# Patient Record
Sex: FEMALE | Race: WHITE | NOT HISPANIC OR LATINO | Employment: UNEMPLOYED | URBAN - METROPOLITAN AREA
[De-identification: names, ages, dates, MRNs, and addresses within clinical notes are randomized per-mention and may not be internally consistent; named-entity substitution may affect disease eponyms.]

---

## 2024-05-20 ENCOUNTER — OFFICE VISIT (OUTPATIENT)
Dept: SLEEP MEDICINE | Facility: CLINIC | Age: 54
End: 2024-05-20
Payer: MEDICAID

## 2024-05-20 VITALS
WEIGHT: 239.3 LBS | HEART RATE: 92 BPM | HEIGHT: 71 IN | OXYGEN SATURATION: 97 % | DIASTOLIC BLOOD PRESSURE: 76 MMHG | RESPIRATION RATE: 18 BRPM | SYSTOLIC BLOOD PRESSURE: 108 MMHG | BODY MASS INDEX: 33.5 KG/M2

## 2024-05-20 DIAGNOSIS — G47.33 OSA (OBSTRUCTIVE SLEEP APNEA): ICD-10-CM

## 2024-05-20 DIAGNOSIS — G47.30 SLEEP DISORDER BREATHING: Primary | ICD-10-CM

## 2024-05-20 PROCEDURE — 1036F TOBACCO NON-USER: CPT | Performed by: GENERAL PRACTICE

## 2024-05-20 PROCEDURE — 99204 OFFICE O/P NEW MOD 45 MIN: CPT | Performed by: GENERAL PRACTICE

## 2024-05-20 RX ORDER — INSULIN ASPART 100 [IU]/ML
15 INJECTION, SUSPENSION SUBCUTANEOUS
COMMUNITY
Start: 2024-03-22 | End: 2024-06-20

## 2024-05-20 RX ORDER — ATORVASTATIN CALCIUM 10 MG/1
10 TABLET, FILM COATED ORAL
COMMUNITY
Start: 2024-03-22 | End: 2024-06-20

## 2024-05-20 RX ORDER — TRIAMTERENE/HYDROCHLOROTHIAZID 37.5-25 MG
1 TABLET ORAL
COMMUNITY
Start: 2023-11-03

## 2024-05-20 RX ORDER — GLIMEPIRIDE 1 MG/1
1 TABLET ORAL
COMMUNITY
Start: 2023-12-29

## 2024-05-20 RX ORDER — SERTRALINE HYDROCHLORIDE 100 MG/1
100 TABLET, FILM COATED ORAL 2 TIMES DAILY
COMMUNITY
Start: 2024-02-08

## 2024-05-20 RX ORDER — MIRABEGRON 50 MG/1
50 TABLET, EXTENDED RELEASE ORAL
COMMUNITY
Start: 2024-03-21 | End: 2025-03-21

## 2024-05-20 RX ORDER — ACETAMINOPHEN 500 MG
TABLET ORAL
COMMUNITY
Start: 2023-01-09

## 2024-05-20 RX ORDER — NITROFURANTOIN 25; 75 MG/1; MG/1
CAPSULE ORAL
COMMUNITY
Start: 2024-04-26

## 2024-05-20 RX ORDER — LEVOTHYROXINE SODIUM 75 UG/1
1 TABLET ORAL
COMMUNITY
Start: 2024-03-22 | End: 2024-09-18

## 2024-05-20 RX ORDER — LORATADINE 10 MG/1
10 TABLET ORAL DAILY PRN
COMMUNITY
Start: 2023-10-16

## 2024-05-20 RX ORDER — BUPROPION HYDROCHLORIDE 150 MG/1
1 TABLET ORAL
COMMUNITY
Start: 2024-03-22

## 2024-05-20 RX ORDER — METFORMIN HYDROCHLORIDE 750 MG/1
1 TABLET, EXTENDED RELEASE ORAL 2 TIMES DAILY
COMMUNITY
Start: 2018-08-21

## 2024-05-20 RX ORDER — FLUTICASONE PROPIONATE 50 MCG
1 SPRAY, SUSPENSION (ML) NASAL
COMMUNITY
Start: 2023-10-16

## 2024-05-20 RX ORDER — FLUCONAZOLE 150 MG/1
TABLET ORAL
COMMUNITY
Start: 2024-02-06

## 2024-05-20 RX ORDER — ESTRADIOL 0.05 MG/D
FILM, EXTENDED RELEASE TRANSDERMAL
COMMUNITY
Start: 2017-10-23

## 2024-05-20 RX ORDER — ESCITALOPRAM OXALATE 20 MG/1
20 TABLET ORAL
COMMUNITY
Start: 2024-05-13 | End: 2024-06-12

## 2024-05-20 ASSESSMENT — PATIENT HEALTH QUESTIONNAIRE - PHQ9
2. FEELING DOWN, DEPRESSED OR HOPELESS: NEARLY EVERY DAY
SUM OF ALL RESPONSES TO PHQ9 QUESTIONS 1 AND 2: 6
5. POOR APPETITE OR OVEREATING: NEARLY EVERY DAY
4. FEELING TIRED OR HAVING LITTLE ENERGY: NEARLY EVERY DAY
6. FEELING BAD ABOUT YOURSELF - OR THAT YOU ARE A FAILURE OR HAVE LET YOURSELF OR YOUR FAMILY DOWN: NEARLY EVERY DAY
3. TROUBLE FALLING OR STAYING ASLEEP OR SLEEPING TOO MUCH: NEARLY EVERY DAY
8. MOVING OR SPEAKING SO SLOWLY THAT OTHER PEOPLE COULD HAVE NOTICED. OR THE OPPOSITE, BEING SO FIGETY OR RESTLESS THAT YOU HAVE BEEN MOVING AROUND A LOT MORE THAN USUAL: NOT AT ALL
7. TROUBLE CONCENTRATING ON THINGS, SUCH AS READING THE NEWSPAPER OR WATCHING TELEVISION: NEARLY EVERY DAY
1. LITTLE INTEREST OR PLEASURE IN DOING THINGS: NEARLY EVERY DAY
10. IF YOU CHECKED OFF ANY PROBLEMS, HOW DIFFICULT HAVE THESE PROBLEMS MADE IT FOR YOU TO DO YOUR WORK, TAKE CARE OF THINGS AT HOME, OR GET ALONG WITH OTHER PEOPLE: SOMEWHAT DIFFICULT

## 2024-05-20 ASSESSMENT — COLUMBIA-SUICIDE SEVERITY RATING SCALE - C-SSRS
2. HAVE YOU ACTUALLY HAD ANY THOUGHTS OF KILLING YOURSELF?: NO
1. IN THE PAST MONTH, HAVE YOU WISHED YOU WERE DEAD OR WISHED YOU COULD GO TO SLEEP AND NOT WAKE UP?: NO
6. HAVE YOU EVER DONE ANYTHING, STARTED TO DO ANYTHING, OR PREPARED TO DO ANYTHING TO END YOUR LIFE?: NO

## 2024-05-20 ASSESSMENT — ENCOUNTER SYMPTOMS
OCCASIONAL FEELINGS OF UNSTEADINESS: 1
DEPRESSION: 1
LOSS OF SENSATION IN FEET: 1

## 2024-05-20 NOTE — PROGRESS NOTES
Patient: Tasneem Daly    [id number]  : 1970 -- AGE 54 y.o.    Provider: Melva Walden DO     Location [facility]   Service Date: 2024              [facility]  New Visit Note        HISTORY OF PRESENT ILLNESS     The patient's referring provider is: Cindy Knight, *    HISTORY OF PRESENT ILLNESS   Tasneem Daly is a 54 y.o. female who presents to a [facility] for a sleep medicine evaluation with concerns of Sleep Apnea (Diagnosed with sleep apnea around 2017 had a [facility]. Used a cpap machine for around 2 years. Stopped using cpap machine after she discovered mold was in the machine. ).     The patient  has a past medical history of Personal history of other endocrine, nutritional and metabolic disease (2014)..    PAST SLEEP HISTORY    Patient has pmhx including DM, htn?, hypothyroidism, anxiety/ depression.   In , patient was tested for sleep apnea due to night time awakenings and snoring. She was found to have sleep apnea and used pap therapy for about 2yrs but stopped due to finding mold in the water container.     CURRENT HISTORY    On today's visit, 2024, the patient reports that she would like to establish care for her sleep apnea; she would like to restart pap therapy.       Sleep schedule  on weekdays / work days:  Usual Bedtime: 12am  Sleep latency: 2hrs, sometimes get up to move around.   Wake time : 7am  Total sleep time average/day: 5-6 hours/day  Awakenings: 4-5x per night, nocturia, ringing in the ear, noise on the road, variable length.   Naps: n    Sleep schedule  on weekends/non work days :  Same as above     Sleep aids: melatonin 10mg, 2x per week, unclear if it helps.   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       am headaches: y  night sweats: y    Sleep is described as unrefreshing.     Daytime sleepiness: sometimes  Fatigue or decreased energy: sometimes  Difficulty remembering things in daytime: y  Difficulty staying focused in daytime: :y  Irritable during the day: y    Drowsy driving: n  Hx of car accident: n  Near-miss Car accident: n      RLS screen:  RLSSCREEN: - Sensations: Patient does not have unusual sensations in their extremities that cause an urge to move them   Sometimes her legs \"jerk\" but does not have to urge to move legs.     Sleep-related behaviors: DENIES      ESS: No data recorded       REVIEW OF SYSTEMS     REVIEW OF SYSTEMS  Review of Systems   All other systems reviewed and are negative.        ALLERGIES AND MEDICATIONS     ALLERGIES  Allergies   Allergen Reactions    Penicillins Unknown       MEDICATIONS  Current Outpatient Medications   Medication Sig Dispense Refill    atorvastatin (Lipitor) 10 mg tablet Take 1 tablet (10 mg) by mouth once daily.      blood sugar diagnostic strip Use 4 times daily to monitor blood glucose.      buPROPion XL (Wellbutrin XL) 150 mg 24 hr tablet Take 1 tablet (150 mg) by mouth once daily in the morning. Take before meals.      escitalopram (Lexapro) 20 mg tablet Take 1 tablet (20 mg) by mouth once daily.      estradiol (Vivelle-DOT) 0.05 mg/24 hr patch Place on the skin.      fluconazole (Diflucan) 150 mg tablet       fluticasone (Flonase) 50 mcg/actuation nasal spray Administer 1 spray into affected nostril(s) once daily.      glimepiride (Amaryl) 1 mg tablet Take 1 tablet (1 mg) by mouth once daily with breakfast.      insulin asp prt-insulin aspart (NovoLOG Mix 70-30) 100 unit/mL (70-30) injection Inject 15 Units under the skin.      levothyroxine (Synthroid, Levoxyl) 75 mcg tablet Take 1 tablet (75 mcg) by mouth once daily in the morning. Take before " "meals.      loratadine (Claritin) 10 mg tablet Take 1 tablet (10 mg) by mouth once daily as needed.      melatonin 5 mg tablet As needed      metFORMIN XR (Glucophage-XR) 750 mg 24 hr tablet Take 1 tablet (750 mg) by mouth 2 times a day.      mirabegron (Myrbetriq) 50 mg tablet extended release 24 hr 24 hr tablet Take 1 tablet (50 mg) by mouth once daily.      nitrofurantoin, macrocrystal-monohydrate, (Macrobid) 100 mg capsule       sertraline (Zoloft) 100 mg tablet Take 1 tablet (100 mg) by mouth twice a day.      triamterene-hydrochlorothiazid (Maxzide-25) 37.5-25 mg tablet Take 1 tablet by mouth once daily.       No current facility-administered medications for this visit.         PAST HISTORY     PAST MEDICAL HISTORY  She  has a past medical history of Personal history of other endocrine, nutritional and metabolic disease (06/17/2014).       PAST SURGICAL HISTORY:  Past Surgical History:   Procedure Laterality Date    TONSILLECTOMY  06/11/2014    Tonsillectomy       FAMILY HISTORY  Family History   Problem Relation Name Age of Onset    Heart disease Mother             SOCIAL HISTORY  She  reports that she has quit smoking. Her smoking use included cigarettes. She has never used smokeless tobacco. She reports that she does not drink alcohol and does not use drugs.     Caffeine consumption: 2 cups in am    PHYSICAL EXAM     VITAL SIGNS: /76   Pulse 92   Resp 18 Comment: neck circumfrance 13  Ht 1.803 m (5' 11\")   Wt 109 kg (239 lb 4.8 oz)   SpO2 97%   BMI 33.38 kg/m²      PREVIOUS WEIGHTS:  Wt Readings from Last 3 Encounters:   05/20/24 109 kg (239 lb 4.8 oz)       Physical Exam  Constitutional: Alert and oriented, cooperative, no obvious distress.   HENT: normocephalic.   Eyes: PERRLA, nonicteric   Neck: Supple, trachea midline   respiratory: CTA bilaterally, no wheezing/ crackles/ cough  Cardiac: no rub/ gallops  GI:BS in all 4 quadrants, Soft, nontender, no masses  musculoskeletal/ Extremities: No " clubbing  integumentary: no significant rashes observed.   Neurologic: AOx3.   psychiatric: appropriate mood and affect.  Modified Mallampati: 3      RESULTS/DATA         ASSESSMENT/PLAN     Ms. Daly is a 54 y.o. female and  has a past medical history of Personal history of other endocrine, nutritional and metabolic disease (06/17/2014). She was referred to the [facility] for evaluation of sleep apnea.     Problem List Items Addressed This Visit    None  Visit Diagnoses       Sleep disorder breathing    -  Primary    Relevant Orders    In-Center Sleep Study (Sleep Provider Only)    GILBERTO (obstructive sleep apnea)                Problem List and Orders    Patient has pmhx including DM, htn?, hypothyroidism, anxiety/ depression.       1- sleep disorder breathing/ hx of sleep apnea.   Symptoms include snoring, nocturia, un-refreshing sleep, witnessed apneas, morning dry mouth.     -ordered sleep study  -had a sleep study at [facility] in 2017? No records found, requesting.   -was on pap therapy but machine has mold? Thus stopped using it. Would like to restart pap therapy but need sleep study records to review.   -patient did not bring machine with her to appointment and no downloads available today.     -do not drive or operate heavy machinery if drowsy.  -avoid sleeping on your back.   -avoid sedating substances/ medication, alcohol, illicit drugs and tobacco.    2- anxiety/ depression  Denies any current suicidal ideation, denies wanting to hurt herself or others. Follows with a center and is currently on meds which are helping her.     3- Obesity  counseled on eating a healthy diet and exercising as tolerated.    Follow up after sleep study or sooner as needed.

## 2024-05-20 NOTE — PATIENT INSTRUCTIONS
[facility]   [facility] [place]note 2[place]  [address] [address]  [phone]       NAME: Tasneem Daly   DATE: 5/20/2024     Your Sleep Provider Today: Melva Walden DO  Your Primary Care Physician: No primary care provider on file.   Your Referring Provider: Cindy Knight, *    DIAGNOSIS:   1. Sleep disorder breathing  In-Center Sleep Study (Sleep Provider Only)      2. GILBERTO (obstructive sleep apnea)            Thank you for coming to the Sleep Medicine Clinic today! Your sleep medicine provider today was: Melva Walden DO Below is a summary of your treatment plan, other important information, and our contact numbers:      TREATMENT PLAN   Follow up after sleep study or sooner as needed.       IMPORTANT INFORMATION     Call 911 for medical emergencies.  Our offices are generally open from Monday-Friday, 9 am - 5 pm.  If you need to get in touch with me, you may either call me and my team(number is below) or you can use [employer].  If a referral for a test, for CPAP, or for another specialist was made, and you have not heard about scheduling this within a week, please call scheduling at [phone]).  If you are unable to make your appointment for clinic or an overnight study, kindly call the office at least 48 hours in advance to cancel and reschedule.  If you are on CPAP, please bring your device's card or the device to each clinic appointment.   There are no supporting services by either the sleep doctors or their staff on weekends and Holidays, or after 5 PM on weekdays.   If you have been asked to come to a sleep study, make sure you bring toiletries, a comfy pillow, and any nighttime medications that you may regularly take. Also be sure to eat dinner before you arrive. We generally do not provide meals.      PRESCRIPTIONS     We require 7 days advanced notice for prescription refills. If we do not receive the request in this time, we  cannot guarantee that your medication will be refilled in time.      IMPORTANT PHONE NUMBERS     Sleep Medicine Clinic Fax: [phone]  Appointments (for Pediatric Sleep Clinic): [phone] - option 1  Appointments (for Adult Sleep Clinic): [phone] - option 2  Appointments (For Sleep Studies): [phone] - option 3  Behavioral Sleep Medicine: [phone]  Sleep Surgery: [phone]  ENT (Otolaryngology): [phone]  Headache Clinic (Neurology): [phone]  Neurology: [phone]  Psychiatry: [phone]  Pulmonary Function Testing (PFT) Center: [phone]  Pulmonary Medicine: [phone]  [employer] (DME): [phone]  [employer] (DME): [phone]  [facility] ([facility]): [phone]      OUR ADULT SLEEP MEDICINE TEAM   Please do not hesitate to call the office or sleep nurse with any questions between appointments:    Adult Sleep Nurses (Juliet Kaye, RN and Teresita Bush RN):  For clinical questions and refilling prescriptions: [phone]  Email sleep diaries and other documents at: [email or web address]    Adult Sleep Medicine Secretaries:  Yari Sosa (For Miquel/Gottlieb/Krise/Strohl/Yeh/Gonzales):   P: [phone]  F: [phone]  Mag Pascual (For Blue/Guggenbiller): P: [phone]  Fax: [phone]  Jackie Lezama (For Jurcevic/Blank): P: [phone]  F: [phone]  Shakila Perez (For [place]): P: [phone]  F: [phone]  Nusrat Paniagua (For Heather/Lisandra/Zakhary): P: [phone]  F: [phone]  Lisandra Rucker (For Jason/Hollins): P: [phone]  F: [phone]     Adult Sleep Medicine Advanced Practice Providers:  Idris Talley ([place], [place])  Francie Fry ([facility])  Nieves Evangelista CNP ([facility])  Shellie Cody CNP ([place], [place], [facility])  Yamilet Alicea ([facility], [facility])  Jimmy Hollins CNP ([facility])        OUR SLEEP TESTING  "LOCATIONS     Our team will contact you to schedule your sleep study, however, you can contact us as follow:  Main Phone Line (scheduling only): [phone]), option 3  Adult and Pediatric Locations   [place] (6 years and older): [facility] [place] [facility]"" - 4th floor ([address] [facility]) After hours line: [phone]  [facility] (Main campus: All ages): [facility], 6th floor. After hours line: [phone]   [place] (5 years and older; younger considered on case-by-case basis): [address]; [address], [address]. Scheduling  After hours line: [phone]   [place] (6 years and older): [address]; [address]; [address]   [place] (6 years and older): [address] [facility] [facility] [address], [address]  After hours line: [phone]   [place] (13 years and older) in [place]: [address], 2nd floor  After hours line: [phone]   [place] (13 year and older): [address]  After hours line: [phone]     Adult Only Locations:   [place] (18 years and older): [address] [facility], 2nd floor   [place] (18 years and older): [address] [facility]; 4th floor  After hours line: [phone]  [facility] (18 years and older) at [place]: [address] [place] [address]  After hours line: [phone]          CONTACTING YOUR SLEEP MEDICINE PROVIDER     Send a message directly to your provider through \"My Chart\", which is the email service through your  Records Account: [email or web address]   Call [phone] and leave a message. One of the administrative assistants will forward the message to your sleep medicine provider through \"My Chart\" and/or email.     Your sleep medicine provider for this visit was: Melva Walden,         "

## 2024-07-24 ENCOUNTER — APPOINTMENT (OUTPATIENT)
Dept: SLEEP MEDICINE | Facility: CLINIC | Age: 54
End: 2024-07-24
Payer: MEDICAID

## 2024-08-30 ENCOUNTER — APPOINTMENT (OUTPATIENT)
Dept: INTEGRATIVE MEDICINE | Facility: CLINIC | Age: 54
End: 2024-08-30
Payer: MEDICAID

## 2024-09-05 ENCOUNTER — APPOINTMENT (OUTPATIENT)
Dept: SLEEP MEDICINE | Facility: CLINIC | Age: 54
End: 2024-09-05
Payer: MEDICAID

## 2024-09-05 VITALS
HEIGHT: 71 IN | HEART RATE: 96 BPM | SYSTOLIC BLOOD PRESSURE: 117 MMHG | RESPIRATION RATE: 18 BRPM | DIASTOLIC BLOOD PRESSURE: 80 MMHG | BODY MASS INDEX: 33.74 KG/M2 | WEIGHT: 241 LBS

## 2024-09-05 DIAGNOSIS — F41.9 ANXIETY AND DEPRESSION: ICD-10-CM

## 2024-09-05 DIAGNOSIS — F32.A ANXIETY AND DEPRESSION: ICD-10-CM

## 2024-09-05 DIAGNOSIS — G47.33 OSA (OBSTRUCTIVE SLEEP APNEA): Primary | ICD-10-CM

## 2024-09-05 PROCEDURE — 1036F TOBACCO NON-USER: CPT | Performed by: GENERAL PRACTICE

## 2024-09-05 PROCEDURE — 3008F BODY MASS INDEX DOCD: CPT | Performed by: GENERAL PRACTICE

## 2024-09-05 PROCEDURE — 99214 OFFICE O/P EST MOD 30 MIN: CPT | Performed by: GENERAL PRACTICE

## 2024-09-05 ASSESSMENT — PATIENT HEALTH QUESTIONNAIRE - PHQ9
3. TROUBLE FALLING OR STAYING ASLEEP OR SLEEPING TOO MUCH: NEARLY EVERY DAY
4. FEELING TIRED OR HAVING LITTLE ENERGY: NEARLY EVERY DAY
SUM OF ALL RESPONSES TO PHQ9 QUESTIONS 1 AND 2: 4
7. TROUBLE CONCENTRATING ON THINGS, SUCH AS READING THE NEWSPAPER OR WATCHING TELEVISION: NEARLY EVERY DAY
9. THOUGHTS THAT YOU WOULD BE BETTER OFF DEAD, OR OF HURTING YOURSELF: MORE THAN HALF THE DAYS
6. FEELING BAD ABOUT YOURSELF - OR THAT YOU ARE A FAILURE OR HAVE LET YOURSELF OR YOUR FAMILY DOWN: NEARLY EVERY DAY
SUM OF ALL RESPONSES TO PHQ QUESTIONS 1-9: 24
5. POOR APPETITE OR OVEREATING: NEARLY EVERY DAY
2. FEELING DOWN, DEPRESSED OR HOPELESS: MORE THAN HALF THE DAYS
8. MOVING OR SPEAKING SO SLOWLY THAT OTHER PEOPLE COULD HAVE NOTICED. OR THE OPPOSITE, BEING SO FIGETY OR RESTLESS THAT YOU HAVE BEEN MOVING AROUND A LOT MORE THAN USUAL: NEARLY EVERY DAY
10. IF YOU CHECKED OFF ANY PROBLEMS, HOW DIFFICULT HAVE THESE PROBLEMS MADE IT FOR YOU TO DO YOUR WORK, TAKE CARE OF THINGS AT HOME, OR GET ALONG WITH OTHER PEOPLE: EXTREMELY DIFFICULT
1. LITTLE INTEREST OR PLEASURE IN DOING THINGS: MORE THAN HALF THE DAYS

## 2024-09-05 ASSESSMENT — COLUMBIA-SUICIDE SEVERITY RATING SCALE - C-SSRS
1. IN THE PAST MONTH, HAVE YOU WISHED YOU WERE DEAD OR WISHED YOU COULD GO TO SLEEP AND NOT WAKE UP?: NO
6. HAVE YOU EVER DONE ANYTHING, STARTED TO DO ANYTHING, OR PREPARED TO DO ANYTHING TO END YOUR LIFE?: NO
2. HAVE YOU ACTUALLY HAD ANY THOUGHTS OF KILLING YOURSELF?: NO

## 2024-09-05 ASSESSMENT — SLEEP AND FATIGUE QUESTIONNAIRES
HOW LIKELY ARE YOU TO NOD OFF OR FALL ASLEEP WHILE SITTING QUIETLY AFTER LUNCH WITHOUT ALCOHOL: WOULD NEVER DOZE
HOW LIKELY ARE YOU TO NOD OFF OR FALL ASLEEP WHILE LYING DOWN TO REST IN THE AFTERNOON WHEN CIRCUMSTANCES PERMIT: HIGH CHANCE OF DOZING
SITING INACTIVE IN A PUBLIC PLACE LIKE A CLASS ROOM OR A MOVIE THEATER: WOULD NEVER DOZE
HOW LIKELY ARE YOU TO NOD OFF OR FALL ASLEEP WHEN YOU ARE A PASSENGER IN A CAR FOR AN HOUR WITHOUT A BREAK: WOULD NEVER DOZE
HOW LIKELY ARE YOU TO NOD OFF OR FALL ASLEEP WHILE WATCHING TV: HIGH CHANCE OF DOZING
HOW LIKELY ARE YOU TO NOD OFF OR FALL ASLEEP IN A CAR, WHILE STOPPED FOR A FEW MINUTES IN TRAFFIC: WOULD NEVER DOZE
ESS-CHAD TOTAL SCORE: 12
HOW LIKELY ARE YOU TO NOD OFF OR FALL ASLEEP WHILE SITTING AND READING: HIGH CHANCE OF DOZING
HOW LIKELY ARE YOU TO NOD OFF OR FALL ASLEEP WHILE SITTING AND TALKING TO SOMEONE: HIGH CHANCE OF DOZING

## 2024-09-05 ASSESSMENT — ENCOUNTER SYMPTOMS
OCCASIONAL FEELINGS OF UNSTEADINESS: 1
LOSS OF SENSATION IN FEET: 1
DEPRESSION: 1

## 2024-09-05 NOTE — PATIENT INSTRUCTIONS
[facility] Sleep Medicine   [facility]  [address] [facility] [address]  [phone]       NAME: Tasneem Daly   DATE: 9/5/2024     Your Sleep Provider Today: Melva Walden DO  Your Primary Care Physician: No Assigned PCP Generic Provider, MD   Your Referring Provider: No ref. provider found    DIAGNOSIS:   1. GILBERTO (obstructive sleep apnea)  Positive Airway Pressure (PAP) Therapy      2. BMI 33.0-33.9,adult        3. Anxiety and depression            Thank you for coming to the Sleep Medicine Clinic today! Your sleep medicine provider today was: Melva Walden DO Below is a summary of your treatment plan, other important information, and our contact numbers:      TREATMENT PLAN     Follow up in 3months or sooner as needed     Instructions - Common GILBERTO Recs: - For your sleep apnea, continue to use your PAP every night and use it whenever you are sleeping.   - Avoid alcohol or sedatives several hours prior to sleeping.   - Get additional supplies for your PAP (e.g., mask, hose, filters) every 3 months or as your insurance allows from your [employer] company. Replacement cushions for your PAP mask can be requested monthly if airseals are an issue.  - Remember to clean your mask, tubings, and water chamber regularly as instructed.  - Avoid driving or operating heavy machinery when drowsy. A person driving while sleepy is five (5) times more likely to have an accident. If you feel sleepy, pull over and take a short power nap (sleep for less than 30 minutes). Otherwise, ask somebody to drive you.      IMPORTANT INFORMATION     Call 911 for medical emergencies.  Our offices are generally open from Monday-Friday, 9 am - 5 pm.  If you need to get in touch with me, you may either call me and my team(number is below) or you can use [employer].  If a referral for a test, for CPAP, or for another specialist was made, and you have not heard about scheduling this within a week, please  call scheduling at [phone]).  If you are unable to make your appointment for clinic or an overnight study, kindly call the office at least 48 hours in advance to cancel and reschedule.  If you are on CPAP, please bring your device's card or the device to each clinic appointment.   There are no supporting services by either the sleep doctors or their staff on weekends and Holidays, or after 5 PM on weekdays.   If you have been asked to come to a sleep study, make sure you bring toiletries, a comfy pillow, and any nighttime medications that you may regularly take. Also be sure to eat dinner before you arrive. We generally do not provide meals.      PRESCRIPTIONS     We require 7 days advanced notice for prescription refills. If we do not receive the request in this time, we cannot guarantee that your medication will be refilled in time.      IMPORTANT PHONE NUMBERS     Sleep Medicine Clinic Fax: [phone]  Appointments (for Pediatric Sleep Clinic): [phone] - option 1  Appointments (for Adult Sleep Clinic): [phone] - option 2  Appointments (For Sleep Studies): [phone] - option 3  Behavioral Sleep Medicine: [phone]  Sleep Surgery: [phone]  ENT (Otolaryngology): [phone]  Headache Clinic (Neurology): [phone]  Neurology: [phone]  Psychiatry: [phone]  Pulmonary Function Testing (PFT) Center: [phone]  Pulmonary Medicine: [phone]  [employer] (DME): [phone]  [employer] (DME): [phone]  [facility] ([facility]): [phone]      OUR ADULT SLEEP MEDICINE TEAM   Please do not hesitate to call the office or sleep nurse with any questions between appointments:    Adult Sleep Nurses (Juliet Kaye RN and Teresita Bush RN):  For clinical questions and refilling prescriptions: [phone]  Email sleep diaries and other documents at: [email or web address]    Adult Sleep Medicine Secretaries:  Yari  Ian (For Miquel/Gottlieb/Krise/Strohl/Yeh/Gonzales):   P: [phone]  F: [phone]  Mag Pascual (For Blue/Guggenbiller): P: [phone]  Fax: [phone]  Jackie Lezama (For Jurcevic/Blank): P: [phone]  F: [phone]  Shakila Perez (For [place]): P: [phone]  F: [phone]  Nusrat Paniagua (For Heather/Lisandra/Zakhary): P: [phone]  F: [phone]  Lisandra Rucker (For Jason/Hollins): P: [phone]  F: [phone]     Adult Sleep Medicine Advanced Practice Providers:  Idris Talley ([place], [place])  Francie Fry ([facility])  Nieves Evangelista CNP ([place], [place], [place])  Shellie Cody CNP ([place], [place], [place])  Yamilet Alicea ([place], [place], [place])  Jimmy Hollins CNP ([facility])        OUR SLEEP TESTING LOCATIONS     Our team will contact you to schedule your sleep study, however, you can contact us as follow:  Main Phone Line (scheduling only): [phone]), option 3  Adult and Pediatric Locations  [facility] (6 years and older): [facility] - 4th floor ([address] [place] [address]) After hours line: [phone]  [facility] (Main campus: All ages): [facility], 6th floor. After hours line: [phone]   [place] (5 years and older; younger considered on case-by-case basis): [address]; [address], [address]. Scheduling  After hours line: [phone]   [place] (6 years and older): [address]; [address]; [address]   [place] (6 years and older): [address] [facility] [address]  After hours line: [phone]   [place] (13 years and older) in [place]: [address], 2nd floor  After hours line: [phone]   [place] (13 year and older): [address] [facility] [address]  After hours line: [phone]     Adult Only Locations:   [place] (18 years and older): [address] [place] [address], 2nd floor   [place] (18 years and older):  "[address] [facility]; 4th floor  After hours line: [phone]   [place] (18 years and older) at [place]: [address] [place] [address]  After hours line: [phone]          CONTACTING YOUR SLEEP MEDICINE PROVIDER     Send a message directly to your provider through \"My Chart\", which is the email service through your  Records Account: [email or web address]:// [email or web address]   Call [phone] and leave a message. One of the administrative assistants will forward the message to your sleep medicine provider through \"My Chart\" and/or email.     Your sleep medicine provider for this visit was: Melva Walden DO        "

## 2024-09-05 NOTE — PROGRESS NOTES
Patient: Tasneem Daly    [id number]  : 1970 -- AGE 54 y.o.    Provider: Melva Walden DO     Location [facility]   Service Date: 2024              [facility]  Follow up Visit Note        HISTORY OF PRESENT ILLNESS     HISTORY OF PRESENT ILLNESS   Tasneem Daly is a 54 y.o. female who presents to a [facility] for a sleep medicine evaluation with concerns of Follow-up.     The patient  has a past medical history of Personal history of other endocrine, nutritional and metabolic disease (2014)..    PAST SLEEP HISTORY    Patient has pmhx including DM, htn?, hypothyroidism, anxiety/ depression.   In 2017, patient was tested for sleep apnea due to night time awakenings and snoring. She was found to have sleep apnea and used pap therapy for about 2yrs but stopped due to finding mold in the water container.     CURRENT HISTORY    24, the patient reports that she would like to establish care for her sleep apnea; she would like to restart pap therapy.     24  Patient brought some records from [facility] with her today. She is interested in restarting pap therapy. She does not have her old pap machine.     Sleep schedule  on weekdays / work days:  Usual Bedtime: 12am  Sleep latency: 2hrs, sometimes get up to move around.   Wake time : 7am  Total sleep time average/day: 5-6 hours/day  Awakenings: 4-5x per night, nocturia, ringing in the ear, noise on the road, variable length.   Naps: n    Sleep schedule  on weekends/non work days :  Same as above     Sleep aids: melatonin 10mg, 2x per week, unclear if it helps.   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      "  am headaches: y  night sweats: y    Sleep is described as unrefreshing.     Daytime sleepiness: sometimes  Fatigue or decreased energy: sometimes  Difficulty remembering things in daytime: y  Difficulty staying focused in daytime: :y  Irritable during the day: y    Drowsy driving: n  Hx of car accident: n  Near-miss Car accident: n      RLS screen:  RLSSCREEN: - Sensations: Patient does not have unusual sensations in their extremities that cause an urge to move them   Sometimes her legs \"jerk\" but does not have to urge to move legs.     Sleep-related behaviors: DENIES      ESS: 12       REVIEW OF SYSTEMS     REVIEW OF SYSTEMS  Review of Systems   All other systems reviewed and are negative.        ALLERGIES AND MEDICATIONS     ALLERGIES  Allergies   Allergen Reactions    Penicillins Unknown       MEDICATIONS  Current Outpatient Medications   Medication Sig Dispense Refill    blood sugar diagnostic strip Use 4 times daily to monitor blood glucose.      buPROPion XL (Wellbutrin XL) 150 mg 24 hr tablet Take 1 tablet (150 mg) by mouth once daily in the morning. Take before meals.      estradiol (Vivelle-DOT) 0.05 mg/24 hr patch Place on the skin.      fluconazole (Diflucan) 150 mg tablet       fluticasone (Flonase) 50 mcg/actuation nasal spray Administer 1 spray into affected nostril(s) once daily.      glimepiride (Amaryl) 1 mg tablet Take 1 tablet (1 mg) by mouth once daily with breakfast.      levothyroxine (Synthroid, Levoxyl) 75 mcg tablet Take 1 tablet (75 mcg) by mouth once daily in the morning. Take before meals.      loratadine (Claritin) 10 mg tablet Take 1 tablet (10 mg) by mouth once daily as needed.      melatonin 5 mg tablet As needed      metFORMIN XR (Glucophage-XR) 750 mg 24 hr tablet Take 1 tablet (750 mg) by mouth 2 times a day.      mirabegron (Myrbetriq) 50 mg tablet extended release 24 hr 24 hr tablet Take 1 tablet (50 mg) by mouth once daily.      nitrofurantoin, macrocrystal-monohydrate, " "(Macrobid) 100 mg capsule       sertraline (Zoloft) 100 mg tablet Take 1 tablet (100 mg) by mouth twice a day.      triamterene-hydrochlorothiazid (Maxzide-25) 37.5-25 mg tablet Take 1 tablet by mouth once daily.      atorvastatin (Lipitor) 10 mg tablet Take 1 tablet (10 mg) by mouth once daily.      escitalopram (Lexapro) 20 mg tablet Take 1 tablet (20 mg) by mouth once daily.      insulin asp prt-insulin aspart (NovoLOG Mix 70-30) 100 unit/mL (70-30) injection Inject 15 Units under the skin.       No current facility-administered medications for this visit.         PAST HISTORY     PAST MEDICAL HISTORY  She  has a past medical history of Personal history of other endocrine, nutritional and metabolic disease (06/17/2014).       PAST SURGICAL HISTORY:  Past Surgical History:   Procedure Laterality Date    TONSILLECTOMY  06/11/2014    Tonsillectomy       FAMILY HISTORY  Family History   Problem Relation Name Age of Onset    Heart disease Mother             SOCIAL HISTORY  She  reports that she has quit smoking. Her smoking use included cigarettes. She has never used smokeless tobacco. She reports that she does not drink alcohol and does not use drugs.     Caffeine consumption: 2 cups in am    PHYSICAL EXAM     VITAL SIGNS: /80   Pulse 96   Resp 18   Ht 1.803 m (5' 11\")   Wt 109 kg (241 lb)   BMI 33.61 kg/m²      PREVIOUS WEIGHTS:  Wt Readings from Last 3 Encounters:   09/05/24 109 kg (241 lb)   05/20/24 109 kg (239 lb 4.8 oz)       Physical Exam  Constitutional: Alert and oriented, cooperative, no obvious distress.   HENT: normocephalic.   Neurologic: AOx3.   psychiatric: appropriate mood and affect.  Integumentary: no significant rashes observed over pap mask area.     RESULTS/DATA         ASSESSMENT/PLAN     Ms. Daly is a 54 y.o. female and  has a past medical history of Personal history of other endocrine, nutritional and metabolic disease (06/17/2014). She is following up on sleep apnea.     Problem " List Items Addressed This Visit    None        Problem List and Orders    Patient has pmhx including DM, htn?, hypothyroidism, anxiety/ depression.       1- sleep disorder breathing/ hx of sleep apnea.   Symptoms include snoring, nocturia, un-refreshing sleep, witnessed apneas, morning dry mouth.     12/20/2017 --> AHI 13.5 AASM, AHI 4.7 CMS.     -titration 1/13/2018 --> sleep apnea responded adequately to cpap 4cwp in NREM and 09flN7F in REM.     Reviewed and discussed the above sleep study results and management options in details. All questions answered, patient verbalizes understanding.     -was on pap therapy but machine has mold? Thus stopped using it. Would like to restart pap therapy.   -patient did not bring machine with her to appointment and no downloads available today.     -start Autopap 4-12cwp.   -patient has an order for a sleep study in case needed by insurance, she is scheduled.     -do not drive or operate heavy machinery if drowsy.  -avoid sleeping on your back.   -avoid sedating substances/ medication, alcohol, illicit drugs and tobacco.    2- anxiety/ depression  Denies any current suicidal ideation, denies wanting to hurt herself or others. Follows with a center and is currently on meds which are helping her.   She lost her .     3- Obesity  counseled on eating a healthy diet and exercising as tolerated.    Follow up in 3 months or sooner as needed.

## 2024-10-08 ENCOUNTER — APPOINTMENT (OUTPATIENT)
Dept: SLEEP MEDICINE | Facility: CLINIC | Age: 54
End: 2024-10-08
Payer: MEDICAID

## 2024-11-04 ENCOUNTER — APPOINTMENT (OUTPATIENT)
Dept: INTEGRATIVE MEDICINE | Facility: CLINIC | Age: 54
End: 2024-11-04
Payer: MEDICAID

## 2024-11-04 DIAGNOSIS — M54.2 CHRONIC NECK PAIN: Primary | ICD-10-CM

## 2024-11-04 DIAGNOSIS — M54.59 OTHER LOW BACK PAIN: ICD-10-CM

## 2024-11-04 DIAGNOSIS — G89.29 CHRONIC NECK PAIN: Primary | ICD-10-CM

## 2024-11-04 PROCEDURE — 99202 OFFICE O/P NEW SF 15 MIN: CPT | Performed by: ACUPUNCTURIST

## 2024-11-04 PROCEDURE — 97811 ACUP 1/> W/O ESTIM EA ADD 15: CPT | Performed by: ACUPUNCTURIST

## 2024-11-04 PROCEDURE — 97810 ACUP 1/> WO ESTIM 1ST 15 MIN: CPT | Performed by: ACUPUNCTURIST

## 2024-11-04 ASSESSMENT — ENCOUNTER SYMPTOMS
NECK PAIN: 1
BACK PAIN: 1
HEADACHES: 1

## 2024-11-04 NOTE — PROGRESS NOTES
"Acupuncture Visit:     Please note: Dictation software was used while completing this note and may contain spelling, grammatical, and/or syntax errors.  Please contact me with any questions.    To clinicians, I am always happy to partner with you in the care of your patients. Do not hesitate to call the office or contact me directly regarding any further consultations or with questions regarding the plan of care outlined or patient progress.    Subjective   Patient ID: Tasneem Daly is a 54 y.o. female who presents for Back Pain and Neck Pain    Insurance visit 1 of 30    Neck pain and low back pain    Neck  \"A while\"; pain bilateral; ROM issues  Described as \"dull\", \"heavy\", \"achy\"  Worse: driving, stress  Better: massage gun, ice/heat    Hx of migraine, mainly frontal; no medications; B knee pain; B tinnitus    Back  \"A long time\"; located \"in the middle\"; occasional referral pain down B legs  Better: Ice/heat  Worse: driving/sitting for extended periods    Back Pain  This is a chronic problem. The pain is present in the gluteal. The quality of the pain is described as aching and stabbing. The symptoms are aggravated by sitting, stress and position. Stiffness is present All day. Associated symptoms include headaches.   Neck Pain   This is a chronic problem. The pain is present in the midline, left side, right side and occipital region. The quality of the pain is described as aching and stabbing. The symptoms are aggravated by position and stress. Stiffness is present All day. Associated symptoms include headaches.       Session Information  Is this acupuncture treatment being billed to the patient's insurance company: Yes  This is visit number: 1  The patient has a total number of visits of: 30  Initial Acupuncture Treatment date: 11/04/24  Name of Insurance Company: [employer]  Visit Type: New patient  Medical History Reviewed: I have reviewed pertinent medical history in EHR, and no " contraindications are present to provide treatment         Review of Systems   Musculoskeletal:  Positive for back pain and neck pain.   Neurological:  Positive for headaches.            Provider reviewed plan for the acupuncture session, precautions and contraindications. Patient/guardian/hospital staff has given consent to treat with full understanding of what to expect during the session. Before acupuncture began, provider explained to the patient to communicate at any time if the procedure was causing discomfort past their tolerance level. Patient agreed to advise acupuncturist. The acupuncturist counseled the patient on the risks of acupuncture treatment including pain, infection, bleeding, and no relief of pain. The patient was positioned comfortably. There was no evidence of infection at the site of needle insertions.    Objective   Physical Exam         Treatment Plan  Treatment Goals: Pain management, Wellbeing improvement    Acupuncture Treatment  Patient Position: Lateral recumbent on a table  Needle Guage: 36 guage /.20/ Blue seirin  Body Points - Left: GB41, BL62, ST36, GB34, suboccipitals, upper traps  Body Points - Bilateral: LIV3, lumbar paraspinals x3, levator  Body Points - Right: KD7  Other Techniques Utilized: TDP Lamp, Topicals  Topicals Description: Posumon oil  TDP Lamp Descripton: Low back, 20min  Needle Count In: 17  Needle Count Out: 17  Needle Retention Time (min): 20 minutes  Total Face to Face Time (min): 25 minutes              Assessment/Plan

## 2024-11-04 NOTE — PATIENT INSTRUCTIONS
Frequency of visits: Recommend begin with 6-8 consistent treatments, 1x/week if possible, then re-evaluate for maintenance. Treatment recommendation may change depending on the severity and/or duration of symptoms.    Diet/lifestyle suggestions: Drink enough water over the next few days; apply heat as directed to affected areas    Please feel free to contact me with any questions or concerns

## 2024-12-02 ENCOUNTER — APPOINTMENT (OUTPATIENT)
Dept: INTEGRATIVE MEDICINE | Facility: CLINIC | Age: 54
End: 2024-12-02
Payer: MEDICAID

## 2024-12-02 DIAGNOSIS — G89.29 CHRONIC NECK PAIN: Primary | ICD-10-CM

## 2024-12-02 DIAGNOSIS — M54.2 CHRONIC NECK PAIN: Primary | ICD-10-CM

## 2024-12-02 DIAGNOSIS — M54.59 OTHER LOW BACK PAIN: ICD-10-CM

## 2024-12-02 PROCEDURE — 97811 ACUP 1/> W/O ESTIM EA ADD 15: CPT | Performed by: ACUPUNCTURIST

## 2024-12-02 PROCEDURE — 97810 ACUP 1/> WO ESTIM 1ST 15 MIN: CPT | Performed by: ACUPUNCTURIST

## 2024-12-02 ASSESSMENT — ENCOUNTER SYMPTOMS
HEADACHES: 1
BACK PAIN: 1
NECK PAIN: 1

## 2024-12-02 NOTE — PROGRESS NOTES
"Acupuncture Visit:     Please note: Dictation software was used while completing this note and may contain spelling, grammatical, and/or syntax errors.  Please contact me with any questions.    To clinicians, I am always happy to partner with you in the care of your patients. Do not hesitate to call the office or contact me directly regarding any further consultations or with questions regarding the plan of care outlined or patient progress.    Subjective   Patient ID: Tasneem Daly is a 54 y.o. female who presents for Neck Pain    Insurance visit 2 of 30    Patient noted that her neck and upper back are at baseline; she reports having approximately 4 migraines this month      Initial intake (11/04/2024 MQ)    Neck pain and low back pain    Neck  \"A while\"; pain bilateral; ROM issues  Described as \"dull\", \"heavy\", \"achy\"  Worse: driving, stress  Better: massage gun, ice/heat    Hx of migraine, mainly frontal; no medications; B knee pain; B tinnitus    Back  \"A long time\"; located \"in the middle\"; occasional referral pain down B legs  Better: Ice/heat  Worse: driving/sitting for extended periods    Back Pain  This is a chronic problem. The pain is present in the gluteal. The quality of the pain is described as aching and stabbing. The symptoms are aggravated by sitting, stress and position. Stiffness is present All day. Associated symptoms include headaches.   Neck Pain   This is a chronic problem. The pain is present in the midline, left side, right side and occipital region. The quality of the pain is described as aching and stabbing. The symptoms are aggravated by position and stress. Stiffness is present All day. Associated symptoms include headaches.       Session Information  Is this acupuncture treatment being billed to the patient's insurance company: Yes  This is visit number: 2  The patient has a total number of visits of: 30  Initial Acupuncture Treatment date: 11/04/24  Last Treatment date: 11/04/24  Name " of Insurance Company: [employer]  Visit Type: Follow-up visit  Medical History Reviewed: I have reviewed pertinent medical history in EHR, and no contraindications are present to provide treatment  Description of present complaint: Muscle tension, Myofascial pain, Headache         Review of Systems   Musculoskeletal:  Positive for back pain and neck pain.   Neurological:  Positive for headaches.            Provider reviewed plan for the acupuncture session, precautions and contraindications. Patient/guardian/hospital staff has given consent to treat with full understanding of what to expect during the session. Before acupuncture began, provider explained to the patient to communicate at any time if the procedure was causing discomfort past their tolerance level. Patient agreed to advise acupuncturist. The acupuncturist counseled the patient on the risks of acupuncture treatment including pain, infection, bleeding, and no relief of pain. The patient was positioned comfortably. There was no evidence of infection at the site of needle insertions.    Objective   Physical Exam         Treatment Plan  Treatment Goals: Pain management, Wellbeing improvement                   Assessment/Plan

## 2024-12-09 ENCOUNTER — APPOINTMENT (OUTPATIENT)
Dept: INTEGRATIVE MEDICINE | Facility: CLINIC | Age: 54
End: 2024-12-09
Payer: MEDICAID

## 2024-12-09 DIAGNOSIS — M54.2 CHRONIC NECK PAIN: Primary | ICD-10-CM

## 2024-12-09 DIAGNOSIS — G89.29 CHRONIC NECK PAIN: Primary | ICD-10-CM

## 2024-12-09 PROCEDURE — 97810 ACUP 1/> WO ESTIM 1ST 15 MIN: CPT | Performed by: ACUPUNCTURIST

## 2024-12-09 PROCEDURE — 97811 ACUP 1/> W/O ESTIM EA ADD 15: CPT | Performed by: ACUPUNCTURIST

## 2024-12-09 ASSESSMENT — ENCOUNTER SYMPTOMS
BACK PAIN: 1
NECK PAIN: 1
HEADACHES: 1

## 2024-12-16 ENCOUNTER — APPOINTMENT (OUTPATIENT)
Dept: INTEGRATIVE MEDICINE | Facility: CLINIC | Age: 54
End: 2024-12-16
Payer: MEDICAID

## 2024-12-16 DIAGNOSIS — G89.29 CHRONIC NECK PAIN: Primary | ICD-10-CM

## 2024-12-16 DIAGNOSIS — M54.2 CHRONIC NECK PAIN: Primary | ICD-10-CM

## 2024-12-16 PROCEDURE — 97810 ACUP 1/> WO ESTIM 1ST 15 MIN: CPT | Performed by: ACUPUNCTURIST

## 2024-12-16 PROCEDURE — 97811 ACUP 1/> W/O ESTIM EA ADD 15: CPT | Performed by: ACUPUNCTURIST

## 2024-12-16 ASSESSMENT — ENCOUNTER SYMPTOMS
HEADACHES: 1
NECK PAIN: 1
BACK PAIN: 1

## 2024-12-16 NOTE — PROGRESS NOTES
"Acupuncture Visit:     Please note: Dictation software was used while completing this note and may contain spelling, grammatical, and/or syntax errors.  Please contact me with any questions.    To clinicians, I am always happy to partner with you in the care of your patients. Do not hesitate to call the office or contact me directly regarding any further consultations or with questions regarding the plan of care outlined or patient progress.    Subjective   Patient ID: Tasneem Daly is a 54 y.o. female who presents for Neck Pain and Migraine    Insurance visit 4 of 30    Patient reports continued neck soreness; she notes \"ringing\" in her head today, though a reduction in migraine sx overall this past week      Initial intake (11/04/2024 )    Neck pain and low back pain    Neck  \"A while\"; pain bilateral; ROM issues  Described as \"dull\", \"heavy\", \"achy\"  Worse: driving, stress  Better: massage gun, ice/heat    Hx of migraine, mainly frontal; no medications; B knee pain; B tinnitus    Back  \"A long time\"; located \"in the middle\"; occasional referral pain down B legs  Better: Ice/heat  Worse: driving/sitting for extended periods    Back Pain  This is a chronic problem. The pain is present in the gluteal. The quality of the pain is described as aching and stabbing. The symptoms are aggravated by sitting, stress and position. Stiffness is present All day. Associated symptoms include headaches.   Neck Pain   This is a chronic problem. The pain is present in the midline, left side, right side and occipital region. The quality of the pain is described as aching and stabbing. The symptoms are aggravated by position and stress. Stiffness is present All day. Associated symptoms include headaches.   Migraine   Associated symptoms include back pain and neck pain.       Session Information  Is this acupuncture treatment being billed to the patient's insurance company: Yes  This is visit number: 4  The patient has a total number " of visits of: 30  Initial Acupuncture Treatment date: 11/04/24  Last Treatment date: 12/09/24  Name of Insurance Company: [employer]  Visit Type: Follow-up visit  Medical History Reviewed: I have reviewed pertinent medical history in EHR, and no contraindications are present to provide treatment  Description of present complaint: Muscle tension, Myofascial pain, Headache         Review of Systems   Musculoskeletal:  Positive for back pain and neck pain.   Neurological:  Positive for headaches.            Provider reviewed plan for the acupuncture session, precautions and contraindications. Patient/guardian/hospital staff has given consent to treat with full understanding of what to expect during the session. Before acupuncture began, provider explained to the patient to communicate at any time if the procedure was causing discomfort past their tolerance level. Patient agreed to advise acupuncturist. The acupuncturist counseled the patient on the risks of acupuncture treatment including pain, infection, bleeding, and no relief of pain. The patient was positioned comfortably. There was no evidence of infection at the site of needle insertions.    Objective   Physical Exam         Treatment Plan  Treatment Goals: Pain management, Wellbeing improvement    Acupuncture Treatment  Patient Position: Lateral recumbent on a table  Needle Guage: 36 guage /.20/ Blue seirin  Body Points - Left: KD3  Body Points - Bilateral: LIV3, BL23, BL52, BL14, thoracic paraspinals x2  Body Points - Right: BL62, GB41, GB20  Other Techniques Utilized: Topicals  Topicals Description: Posumon oil  Needle Count In: 15  Needle Count Out: 15  Needle Retention Time (min): 25 minutes  Total Face to Face Time (min): 20 minutes              Assessment/Plan

## 2024-12-23 ENCOUNTER — APPOINTMENT (OUTPATIENT)
Dept: INTEGRATIVE MEDICINE | Facility: CLINIC | Age: 54
End: 2024-12-23
Payer: MEDICAID

## 2024-12-23 DIAGNOSIS — G89.29 CHRONIC NECK PAIN: Primary | ICD-10-CM

## 2024-12-23 DIAGNOSIS — M54.2 CHRONIC NECK PAIN: Primary | ICD-10-CM

## 2024-12-23 DIAGNOSIS — M54.59 OTHER LOW BACK PAIN: ICD-10-CM

## 2024-12-23 PROCEDURE — 97810 ACUP 1/> WO ESTIM 1ST 15 MIN: CPT | Performed by: ACUPUNCTURIST

## 2024-12-23 PROCEDURE — 97811 ACUP 1/> W/O ESTIM EA ADD 15: CPT | Performed by: ACUPUNCTURIST

## 2024-12-23 ASSESSMENT — ENCOUNTER SYMPTOMS
BACK PAIN: 1
NECK PAIN: 1
HEADACHES: 1

## 2024-12-23 NOTE — PROGRESS NOTES
"Acupuncture Visit:     Please note: Dictation software was used while completing this note and may contain spelling, grammatical, and/or syntax errors.  Please contact me with any questions.    To clinicians, I am always happy to partner with you in the care of your patients. Do not hesitate to call the office or contact me directly regarding any further consultations or with questions regarding the plan of care outlined or patient progress.    Subjective   Patient ID: Tasneem Daly is a 54 y.o. female who presents for Neck Pain and Migraine    Insurance visit 5 of 30    Patient reports continued neck soreness, though only 1 migraine since her last visi      Initial intake (11/04/2024 )    Neck pain and low back pain    Neck  \"A while\"; pain bilateral; ROM issues  Described as \"dull\", \"heavy\", \"achy\"  Worse: driving, stress  Better: massage gun, ice/heat    Hx of migraine, mainly frontal; no medications; B knee pain; B tinnitus    Back  \"A long time\"; located \"in the middle\"; occasional referral pain down B legs  Better: Ice/heat  Worse: driving/sitting for extended periods    Back Pain  This is a chronic problem. The pain is present in the gluteal. The quality of the pain is described as aching and stabbing. The symptoms are aggravated by sitting, stress and position. Stiffness is present All day. Associated symptoms include headaches.   Neck Pain   This is a chronic problem. The pain is present in the midline, left side, right side and occipital region. The quality of the pain is described as aching and stabbing. The symptoms are aggravated by position and stress. Stiffness is present All day. Associated symptoms include headaches.   Migraine   Associated symptoms include back pain and neck pain.       Session Information  Is this acupuncture treatment being billed to the patient's insurance company: Yes  This is visit number: 5  The patient has a total number of visits of: 30  Initial Acupuncture Treatment " date: 11/04/24  Last Treatment date: 12/16/24  Name of Insurance Company: [employer]  Visit Type: Follow-up visit  Medical History Reviewed: I have reviewed pertinent medical history in EHR, and no contraindications are present to provide treatment  Description of present complaint: Muscle tension, Myofascial pain, Headache         Review of Systems   Musculoskeletal:  Positive for back pain and neck pain.   Neurological:  Positive for headaches.            Provider reviewed plan for the acupuncture session, precautions and contraindications. Patient/guardian/hospital staff has given consent to treat with full understanding of what to expect during the session. Before acupuncture began, provider explained to the patient to communicate at any time if the procedure was causing discomfort past their tolerance level. Patient agreed to advise acupuncturist. The acupuncturist counseled the patient on the risks of acupuncture treatment including pain, infection, bleeding, and no relief of pain. The patient was positioned comfortably. There was no evidence of infection at the site of needle insertions.    Objective   Physical Exam              Acupuncture Treatment  Patient Position: Lateral recumbent on a table  Needle Guage: 36 guage /.20/ Blue seirin  Body Points - Left: KD3  Body Points - Bilateral: LIV3, Lumbar paraspinals x4, thoracic paraspinals x2  Body Points - Right: BL62, GB41, GB20, GB21  Other Techniques Utilized: Topicals  Topicals Description: Posumon oil  Needle Count In: 19  Needle Count Out: 19  Needle Retention Time (min): 25 minutes  Total Face to Face Time (min): 20 minutes              Assessment/Plan

## 2025-01-13 ENCOUNTER — APPOINTMENT (OUTPATIENT)
Dept: SLEEP MEDICINE | Facility: CLINIC | Age: 55
End: 2025-01-13
Payer: MEDICAID